# Patient Record
Sex: MALE | Race: WHITE | URBAN - METROPOLITAN AREA
[De-identification: names, ages, dates, MRNs, and addresses within clinical notes are randomized per-mention and may not be internally consistent; named-entity substitution may affect disease eponyms.]

---

## 2022-01-19 ENCOUNTER — OFFICE VISIT (OUTPATIENT)
Dept: URBAN - METROPOLITAN AREA CLINIC 26 | Facility: CLINIC | Age: 72
End: 2022-01-19
Payer: MEDICARE

## 2022-01-19 DIAGNOSIS — Z96.1 PRESENCE OF INTRAOCULAR LENS: ICD-10-CM

## 2022-01-19 DIAGNOSIS — H43.393 OTHER VITREOUS OPACITIES, BILATERAL: ICD-10-CM

## 2022-01-19 PROCEDURE — 99204 OFFICE O/P NEW MOD 45 MIN: CPT | Performed by: OPTOMETRIST

## 2022-01-19 PROCEDURE — 92134 CPTRZ OPH DX IMG PST SGM RTA: CPT | Performed by: OPTOMETRIST

## 2022-01-19 RX ORDER — PREDNISOLONE ACETATE 10 MG/ML
1 % SUSPENSION/ DROPS OPHTHALMIC
Qty: 10 | Refills: 1 | Status: INACTIVE
Start: 2022-01-19 | End: 2022-03-23

## 2022-01-19 ASSESSMENT — VISUAL ACUITY
OD: 20/25
OS: 20/50

## 2022-01-19 ASSESSMENT — INTRAOCULAR PRESSURE
OD: 15
OS: 15

## 2022-01-19 ASSESSMENT — KERATOMETRY
OS: 41.13
OD: 41.38

## 2022-01-19 NOTE — IMPRESSION/PLAN
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 Plan: hx of Fuchs OU. s/p DSEK/DMEK x 10-11 years. blurred vision OS x 1 week. k edema OS with edge lift of graft OS. start pred qid OS. will schedule consult with Dr. Jarod Corea.  rtc 1 week for f/u or prn

## 2022-01-25 ENCOUNTER — OFFICE VISIT (OUTPATIENT)
Dept: URBAN - METROPOLITAN AREA CLINIC 26 | Facility: CLINIC | Age: 72
End: 2022-01-25
Payer: MEDICARE

## 2022-01-25 DIAGNOSIS — H18.20 UNSPECIFIED CORNEAL EDEMA: Primary | ICD-10-CM

## 2022-01-25 PROCEDURE — 99213 OFFICE O/P EST LOW 20 MIN: CPT | Performed by: OPTOMETRIST

## 2022-01-25 ASSESSMENT — INTRAOCULAR PRESSURE
OS: 18
OD: 17

## 2022-01-25 NOTE — IMPRESSION/PLAN
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 Plan: edema mildly improved OS but still present
continue pred qid OS x 1 week then bid OS x 1 week
rtc 2 weeks for f/u
keep scheduled appointment with Dr. Malena Morgan.

## 2022-02-08 ENCOUNTER — OFFICE VISIT (OUTPATIENT)
Dept: URBAN - METROPOLITAN AREA CLINIC 26 | Facility: CLINIC | Age: 72
End: 2022-02-08
Payer: MEDICARE

## 2022-02-08 PROCEDURE — 99213 OFFICE O/P EST LOW 20 MIN: CPT | Performed by: OPTOMETRIST

## 2022-02-08 ASSESSMENT — INTRAOCULAR PRESSURE
OS: 19
OD: 17

## 2022-02-08 NOTE — IMPRESSION/PLAN
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 Plan: k edema OS 2/2 failing graft OS. 
continue pred bid OS until seen by Dr. Tino Rivera.  
start Montse 128 alvaro qhs OS
keep scheduled appointment with Dr. Tino Rivera

## 2022-04-29 ENCOUNTER — OFFICE VISIT (OUTPATIENT)
Dept: URBAN - METROPOLITAN AREA CLINIC 24 | Facility: CLINIC | Age: 72
End: 2022-04-29
Payer: MEDICARE

## 2022-04-29 DIAGNOSIS — T86.8412 CORNEAL TRANSPLANT FAILURE, LEFT EYE: Primary | ICD-10-CM

## 2022-04-29 DIAGNOSIS — Z96.1 PRESENCE OF INTRAOCULAR LENS: ICD-10-CM

## 2022-04-29 DIAGNOSIS — H43.393 OTHER VITREOUS OPACITIES, BILATERAL: ICD-10-CM

## 2022-04-29 PROCEDURE — 92025 CPTRIZED CORNEAL TOPOGRAPHY: CPT | Performed by: OPHTHALMOLOGY

## 2022-04-29 PROCEDURE — 99204 OFFICE O/P NEW MOD 45 MIN: CPT | Performed by: OPHTHALMOLOGY

## 2022-04-29 PROCEDURE — 76514 ECHO EXAM OF EYE THICKNESS: CPT | Performed by: OPHTHALMOLOGY

## 2022-04-29 RX ORDER — PREDNISOLONE ACETATE 10 MG/ML
1 % SUSPENSION/ DROPS OPHTHALMIC
Qty: 5 | Refills: 3 | Status: ACTIVE
Start: 2022-04-29

## 2022-04-29 ASSESSMENT — KERATOMETRY
OS: 40.88
OD: 41.24

## 2022-04-29 ASSESSMENT — VISUAL ACUITY
OD: 20/20
OS: 20/30

## 2022-04-29 ASSESSMENT — INTRAOCULAR PRESSURE
OS: 21
OD: 18

## 2022-04-29 NOTE — IMPRESSION/PLAN
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 Plan: Increase Montse to QID Increase Pred to QID Start Montse alvaro qhs 
Explained to pt if no improvement, will proceed with repeat DSEK OS

## 2022-04-29 NOTE — IMPRESSION/PLAN
Impression: Other vitreous opacities, bilateral: H43.393. Plan: No retinal pathology. No family history, prior peripheral retinal disease, or other risk factors evident. Warning signs of retinal tear and detachment discussed with patient. To return to clinic STAT if any change in symptoms consistent with RT or RD. Pt being discharged home in stable condition. IV removed, catheter intact, pt tolerated well. Tele monitor removed, given to US. Discharge instructions given to pt, pt verbalized understanding.

## 2022-06-02 ENCOUNTER — OFFICE VISIT (OUTPATIENT)
Dept: URBAN - METROPOLITAN AREA CLINIC 24 | Facility: CLINIC | Age: 72
End: 2022-06-02
Payer: MEDICARE

## 2022-06-02 DIAGNOSIS — T86.8412 CORNEAL TRANSPLANT FAILURE, LEFT EYE: Primary | ICD-10-CM

## 2022-06-02 DIAGNOSIS — Z94.7 CORNEAL TRANSPLANT STATUS: ICD-10-CM

## 2022-06-02 DIAGNOSIS — Z96.1 PRESENCE OF INTRAOCULAR LENS: ICD-10-CM

## 2022-06-02 PROCEDURE — 99213 OFFICE O/P EST LOW 20 MIN: CPT | Performed by: OPHTHALMOLOGY

## 2022-06-02 PROCEDURE — 76514 ECHO EXAM OF EYE THICKNESS: CPT | Performed by: OPHTHALMOLOGY

## 2022-06-02 ASSESSMENT — INTRAOCULAR PRESSURE: OS: 19

## 2022-06-02 ASSESSMENT — VISUAL ACUITY: OS: 20/30

## 2022-06-02 NOTE — IMPRESSION/PLAN
Impression: Corneal transplant failure, left eye: T17.8036. Plan: No improvement in marce and pred QID, alvaro qhs. I would recommend repeat DSAEK surgery, a version of endothelial keratoplasty replacing endothelial cells with descemet's membrane. The rejection rate with this procedure is lower versus the previous corneal transplants. The visual recovery is faster but may still take weeks to months. In patients with a history of glaucoma, retinal problems, prior vitrectomies, or positioning problems, I recommend DSAEK. Risks of DSAEK include similar risks to any intraocular surgery such as bleeding and infection. Risks include rejection, need for additional surgery, need for glasses after, and the risks from the medications used. Steroids should not be stopped without instruction by a doctor. Information packet given. Questions answered. Schedule repeat DSEK OS 60-80 microns; will plan for larger graft to encompass area of MCE causing irritation outside of graft Will bring to [facility]. for undilated preop in [place], specular microscopy

## 2022-07-13 ENCOUNTER — ADULT PHYSICAL (OUTPATIENT)
Dept: URBAN - METROPOLITAN AREA CLINIC 10 | Facility: CLINIC | Age: 72
End: 2022-07-13
Payer: MEDICARE

## 2022-07-13 DIAGNOSIS — Z01.818 ENCOUNTER FOR OTHER PREPROCEDURAL EXAMINATION: Primary | ICD-10-CM

## 2022-07-13 DIAGNOSIS — T86.8412 CORNEAL TRANSPLANT FAILURE, LEFT EYE: ICD-10-CM

## 2022-07-13 DIAGNOSIS — Z96.1 PRESENCE OF INTRAOCULAR LENS: ICD-10-CM

## 2022-07-13 PROCEDURE — 99202 OFFICE O/P NEW SF 15 MIN: CPT | Performed by: PHYSICIAN ASSISTANT

## 2022-07-13 PROCEDURE — 92286 ANT SGM IMG I&R SPECLR MIC: CPT | Performed by: OPHTHALMOLOGY

## 2022-07-13 PROCEDURE — 76514 ECHO EXAM OF EYE THICKNESS: CPT | Performed by: OPHTHALMOLOGY

## 2022-07-13 PROCEDURE — 99213 OFFICE O/P EST LOW 20 MIN: CPT | Performed by: OPHTHALMOLOGY

## 2022-07-13 ASSESSMENT — INTRAOCULAR PRESSURE
OS: 14
OD: 12
OD: 12
OS: 14

## 2022-07-13 NOTE — IMPRESSION/PLAN
Impression: Corneal transplant failure, left eye: A96.0451. Plan: No improvement in marce and pred QID, alvaro qhs. I would recommend repeat DSAEK surgery, a version of endothelial keratoplasty replacing endothelial cells with descemet's membrane. The rejection rate with this procedure is lower versus the previous corneal transplants. The visual recovery is faster but may still take weeks to months. In patients with a history of glaucoma, retinal problems, prior vitrectomies, or positioning problems, I recommend DSAEK. Risks of DSAEK include similar risks to any intraocular surgery such as bleeding and infection. Risks include rejection, need for additional surgery, need for glasses after, and the risks from the medications used. Steroids should not be stopped without instruction by a doctor. Information packet given. Questions answered. Schedule repeat DSEK OS 60-80 microns; will plan for larger graft to encompass area of MCE causing irritation outside of graft Decrease pred and marce to BID, marce alvaro qhs

## 2022-07-28 ENCOUNTER — SURGERY (OUTPATIENT)
Dept: URBAN - METROPOLITAN AREA SURGERY 12 | Facility: SURGERY | Age: 72
End: 2022-07-28
Payer: MEDICARE

## 2022-07-28 PROCEDURE — 65756 CORNEAL TRNSPL ENDOTHELIAL: CPT | Performed by: OPHTHALMOLOGY

## 2022-07-29 ENCOUNTER — POST-OPERATIVE VISIT (OUTPATIENT)
Dept: URBAN - METROPOLITAN AREA CLINIC 24 | Facility: CLINIC | Age: 72
End: 2022-07-29
Payer: MEDICARE

## 2022-07-29 DIAGNOSIS — Z48.810 ENCOUNTER FOR SURGICAL AFTERCARE FOLLOWING SURGERY ON A SENSE ORGAN: Primary | ICD-10-CM

## 2022-07-29 PROCEDURE — 99024 POSTOP FOLLOW-UP VISIT: CPT | Performed by: OPTOMETRIST

## 2022-07-29 ASSESSMENT — INTRAOCULAR PRESSURE: OS: 15

## 2022-08-04 ENCOUNTER — OFFICE VISIT (OUTPATIENT)
Dept: URBAN - METROPOLITAN AREA CLINIC 24 | Facility: CLINIC | Age: 72
End: 2022-08-04
Payer: MEDICARE

## 2022-08-04 DIAGNOSIS — Z94.7 CORNEAL TRANSPLANT STATUS: Primary | ICD-10-CM

## 2022-08-04 PROCEDURE — 99024 POSTOP FOLLOW-UP VISIT: CPT | Performed by: OPHTHALMOLOGY

## 2022-08-04 ASSESSMENT — INTRAOCULAR PRESSURE
OS: 14
OD: 12

## 2022-08-04 NOTE — IMPRESSION/PLAN
Impression: Corneal transplant status: Z94.7.
- s/p DSEK OS Plan: POW#1, doing well, Graft attached 360. IOP adequate. Precautions reviewed, Cont Pred and ATs QID, d/c Oflox. RTC for suture removal, IOP check.

## 2022-09-08 ENCOUNTER — OFFICE VISIT (OUTPATIENT)
Dept: URBAN - METROPOLITAN AREA CLINIC 24 | Facility: CLINIC | Age: 72
End: 2022-09-08
Payer: COMMERCIAL

## 2022-09-08 DIAGNOSIS — Z94.7 CORNEAL TRANSPLANT STATUS: Primary | ICD-10-CM

## 2022-09-08 PROCEDURE — 99024 POSTOP FOLLOW-UP VISIT: CPT | Performed by: OPHTHALMOLOGY

## 2022-09-08 ASSESSMENT — INTRAOCULAR PRESSURE
OD: 12
OS: 18

## 2022-09-08 NOTE — IMPRESSION/PLAN
Impression: Corneal transplant status: Z94.7.
- s/p DSEK OS Plan: POW#4, doing well, graft C/C. Sutures removed today, oflox gtt placed. Start oflox TID x 3 d then d/c. Continue PF QID until _9_/28_ then decrease PF to TID OD do not stop,  ATs prn. Precautions reviewed. RTC  sooner if any problems.  IOP check/refract/Pachs

## 2022-11-17 ENCOUNTER — OFFICE VISIT (OUTPATIENT)
Dept: URBAN - METROPOLITAN AREA CLINIC 24 | Facility: CLINIC | Age: 72
End: 2022-11-17
Payer: MEDICARE

## 2022-11-17 DIAGNOSIS — Z94.7 CORNEAL TRANSPLANT STATUS: Primary | ICD-10-CM

## 2022-11-17 DIAGNOSIS — Z96.1 PRESENCE OF INTRAOCULAR LENS: ICD-10-CM

## 2022-11-17 DIAGNOSIS — H43.393 OTHER VITREOUS OPACITIES, BILATERAL: ICD-10-CM

## 2022-11-17 PROCEDURE — 99213 OFFICE O/P EST LOW 20 MIN: CPT | Performed by: OPHTHALMOLOGY

## 2022-11-17 PROCEDURE — 76514 ECHO EXAM OF EYE THICKNESS: CPT | Performed by: OPHTHALMOLOGY

## 2022-11-17 ASSESSMENT — VISUAL ACUITY
OD: 20/30
OS: 20/25

## 2022-11-17 ASSESSMENT — INTRAOCULAR PRESSURE
OD: 10
OS: 12

## 2022-11-17 NOTE — IMPRESSION/PLAN
Impression: Corneal transplant status: Z94.7.
- s/p DSEK OU, OS 7/22 Plan: POM#4, doing well, graft C/C. Continue PF qd, do not stop. Will watch for resolution of peripheral MCE outside of graft.

## 2023-03-09 ENCOUNTER — OFFICE VISIT (OUTPATIENT)
Dept: URBAN - METROPOLITAN AREA CLINIC 24 | Facility: CLINIC | Age: 73
End: 2023-03-09
Payer: MEDICARE

## 2023-03-09 DIAGNOSIS — Z94.7 CORNEAL TRANSPLANT STATUS: Primary | ICD-10-CM

## 2023-03-09 DIAGNOSIS — Z96.1 PRESENCE OF INTRAOCULAR LENS: ICD-10-CM

## 2023-03-09 DIAGNOSIS — H43.393 OTHER VITREOUS OPACITIES, BILATERAL: ICD-10-CM

## 2023-03-09 PROCEDURE — 99213 OFFICE O/P EST LOW 20 MIN: CPT | Performed by: OPHTHALMOLOGY

## 2023-03-09 RX ORDER — PREDNISOLONE ACETATE 10 MG/ML
1 % SUSPENSION/ DROPS OPHTHALMIC
Qty: 5 | Refills: 11 | Status: ACTIVE
Start: 2023-03-09

## 2023-03-09 ASSESSMENT — INTRAOCULAR PRESSURE
OD: 11
OS: 11

## 2023-03-09 NOTE — IMPRESSION/PLAN
Impression: Corneal transplant status: Z94.7.
- s/p DSEK OU, OS 7/22 Plan: POM#4, doing well, graft C/C. Continue PF qd, do not stop. Stable peripheral MCE outside of graft. Given stability, may return to optom for further care, Cornea prn any problems. Given the fact that pt will be on steroid gtts indefinitely, pt needs to have regular IOP checks and be monitored for glaucoma in general clinic.   Risk of glc and and blindness discussed with pt, compliance with appts with optometry emphasized today

## 2023-09-08 ENCOUNTER — OFFICE VISIT (OUTPATIENT)
Dept: URBAN - METROPOLITAN AREA CLINIC 26 | Facility: CLINIC | Age: 73
End: 2023-09-08
Payer: MEDICARE

## 2023-09-08 DIAGNOSIS — Z94.7 CORNEAL TRANSPLANT STATUS: ICD-10-CM

## 2023-09-08 DIAGNOSIS — H35.363 DRUSEN (DEGENERATIVE) OF MACULA, BILATERAL: ICD-10-CM

## 2023-09-08 DIAGNOSIS — Z96.1 PRESENCE OF INTRAOCULAR LENS: ICD-10-CM

## 2023-09-08 DIAGNOSIS — H43.393 OTHER VITREOUS OPACITIES, BILATERAL: Primary | ICD-10-CM

## 2023-09-08 DIAGNOSIS — H16.223 KERATOCONJUNCTIVITIS SICCA, BILATERAL: ICD-10-CM

## 2023-09-08 PROCEDURE — 92134 CPTRZ OPH DX IMG PST SGM RTA: CPT | Performed by: OPTOMETRIST

## 2023-09-08 PROCEDURE — 92014 COMPRE OPH EXAM EST PT 1/>: CPT | Performed by: OPTOMETRIST

## 2023-09-08 ASSESSMENT — VISUAL ACUITY
OS: 20/25
OD: 20/20

## 2023-09-08 ASSESSMENT — INTRAOCULAR PRESSURE
OD: 10
OS: 12

## 2023-09-08 ASSESSMENT — KERATOMETRY
OD: 41.25
OS: 41.25

## 2024-03-08 ENCOUNTER — OFFICE VISIT (OUTPATIENT)
Dept: URBAN - METROPOLITAN AREA CLINIC 26 | Facility: CLINIC | Age: 74
End: 2024-03-08
Payer: MEDICARE

## 2024-03-08 DIAGNOSIS — Z94.7 CORNEAL TRANSPLANT STATUS: Primary | ICD-10-CM

## 2024-03-08 PROCEDURE — 99213 OFFICE O/P EST LOW 20 MIN: CPT | Performed by: OPTOMETRIST

## 2024-03-08 ASSESSMENT — INTRAOCULAR PRESSURE
OD: 13
OS: 13

## 2024-09-13 ENCOUNTER — OFFICE VISIT (OUTPATIENT)
Dept: URBAN - METROPOLITAN AREA CLINIC 26 | Facility: CLINIC | Age: 74
End: 2024-09-13
Payer: MEDICARE

## 2024-09-13 DIAGNOSIS — H35.363 DRUSEN (DEGENERATIVE) OF MACULA, BILATERAL: ICD-10-CM

## 2024-09-13 DIAGNOSIS — Z96.1 PRESENCE OF INTRAOCULAR LENS: ICD-10-CM

## 2024-09-13 DIAGNOSIS — H43.393 OTHER VITREOUS OPACITIES, BILATERAL: ICD-10-CM

## 2024-09-13 DIAGNOSIS — H16.223 KERATOCONJUNCTIVITIS SICCA, BILATERAL: Primary | ICD-10-CM

## 2024-09-13 DIAGNOSIS — Z94.7 CORNEAL TRANSPLANT STATUS: ICD-10-CM

## 2024-09-13 PROCEDURE — 92014 COMPRE OPH EXAM EST PT 1/>: CPT | Performed by: OPTOMETRIST

## 2024-09-13 PROCEDURE — 92134 CPTRZ OPH DX IMG PST SGM RTA: CPT | Performed by: OPTOMETRIST

## 2024-09-13 ASSESSMENT — INTRAOCULAR PRESSURE
OS: 13
OD: 14

## 2024-09-13 ASSESSMENT — VISUAL ACUITY
OS: 20/20
OD: 20/20

## 2024-09-13 ASSESSMENT — KERATOMETRY
OS: 40.88
OD: 41.50